# Patient Record
Sex: FEMALE | Race: WHITE | NOT HISPANIC OR LATINO | URBAN - METROPOLITAN AREA
[De-identification: names, ages, dates, MRNs, and addresses within clinical notes are randomized per-mention and may not be internally consistent; named-entity substitution may affect disease eponyms.]

---

## 2018-11-08 ENCOUNTER — AMBULATORY - HEALTHEAST (OUTPATIENT)
Dept: CARDIAC REHAB | Facility: CLINIC | Age: 83
End: 2018-11-08

## 2018-11-08 DIAGNOSIS — Z95.2 S/P TAVR (TRANSCATHETER AORTIC VALVE REPLACEMENT): ICD-10-CM

## 2018-11-16 ENCOUNTER — AMBULATORY - HEALTHEAST (OUTPATIENT)
Dept: CARDIAC REHAB | Facility: CLINIC | Age: 83
End: 2018-11-16

## 2018-11-16 DIAGNOSIS — Z95.2 S/P TAVR (TRANSCATHETER AORTIC VALVE REPLACEMENT): ICD-10-CM

## 2018-11-16 RX ORDER — PREDNISONE 2.5 MG/1
7.5 TABLET ORAL DAILY
Status: SHIPPED | COMMUNITY
Start: 2018-11-16

## 2018-11-16 RX ORDER — ALENDRONATE SODIUM 70 MG/1
70 TABLET ORAL
Status: SHIPPED | COMMUNITY
Start: 2018-11-16

## 2018-11-16 RX ORDER — HYDROXYCHLOROQUINE SULFATE 200 MG/1
200 TABLET, FILM COATED ORAL DAILY
Status: SHIPPED | COMMUNITY
Start: 2018-11-16

## 2018-11-16 RX ORDER — WARFARIN SODIUM 2.5 MG/1
2.5 TABLET ORAL SEE ADMIN INSTRUCTIONS
Status: SHIPPED | COMMUNITY
Start: 2018-11-16

## 2018-11-16 RX ORDER — ASPIRIN 81 MG/1
81 TABLET, CHEWABLE ORAL DAILY
Status: SHIPPED | COMMUNITY
Start: 2018-11-16

## 2018-11-16 RX ORDER — PRAVASTATIN SODIUM 40 MG
40 TABLET ORAL AT BEDTIME
Status: SHIPPED | COMMUNITY
Start: 2018-11-16

## 2018-11-16 RX ORDER — LEVOTHYROXINE SODIUM 100 UG/1
100 TABLET ORAL DAILY
Status: SHIPPED | COMMUNITY
Start: 2018-11-16

## 2018-11-16 RX ORDER — AMLODIPINE BESYLATE 10 MG/1
10 TABLET ORAL DAILY
Status: SHIPPED | COMMUNITY
Start: 2018-11-16

## 2018-11-16 RX ORDER — TRIAMTERENE AND HYDROCHLOROTHIAZIDE 37.5; 25 MG/1; MG/1
1 CAPSULE ORAL EVERY MORNING
Status: SHIPPED | COMMUNITY
Start: 2018-11-16

## 2018-11-16 RX ORDER — ACETAMINOPHEN 500 MG
500 TABLET ORAL EVERY 6 HOURS PRN
Status: SHIPPED | COMMUNITY
Start: 2018-11-16

## 2018-11-16 RX ORDER — CITALOPRAM HYDROBROMIDE 20 MG/1
20 TABLET ORAL DAILY
Status: SHIPPED | COMMUNITY
Start: 2018-11-16

## 2018-11-16 RX ORDER — MIRTAZAPINE 15 MG/1
7.5 TABLET, FILM COATED ORAL AT BEDTIME
Status: SHIPPED | COMMUNITY
Start: 2018-11-16

## 2018-11-20 ENCOUNTER — AMBULATORY - HEALTHEAST (OUTPATIENT)
Dept: CARDIAC REHAB | Facility: CLINIC | Age: 83
End: 2018-11-20

## 2018-11-20 DIAGNOSIS — Z95.2 S/P TAVR (TRANSCATHETER AORTIC VALVE REPLACEMENT): ICD-10-CM

## 2018-11-27 ENCOUNTER — AMBULATORY - HEALTHEAST (OUTPATIENT)
Dept: CARDIAC REHAB | Facility: CLINIC | Age: 83
End: 2018-11-27

## 2018-11-27 DIAGNOSIS — Z95.2 S/P TAVR (TRANSCATHETER AORTIC VALVE REPLACEMENT): ICD-10-CM

## 2018-12-04 ENCOUNTER — AMBULATORY - HEALTHEAST (OUTPATIENT)
Dept: CARDIAC REHAB | Facility: CLINIC | Age: 83
End: 2018-12-04

## 2018-12-04 DIAGNOSIS — Z95.2 S/P TAVR (TRANSCATHETER AORTIC VALVE REPLACEMENT): ICD-10-CM

## 2018-12-06 ENCOUNTER — AMBULATORY - HEALTHEAST (OUTPATIENT)
Dept: CARDIAC REHAB | Facility: CLINIC | Age: 83
End: 2018-12-06

## 2018-12-06 DIAGNOSIS — Z95.2 S/P TAVR (TRANSCATHETER AORTIC VALVE REPLACEMENT): ICD-10-CM

## 2018-12-11 ENCOUNTER — AMBULATORY - HEALTHEAST (OUTPATIENT)
Dept: CARDIAC REHAB | Facility: CLINIC | Age: 83
End: 2018-12-11

## 2018-12-11 DIAGNOSIS — Z95.2 S/P TAVR (TRANSCATHETER AORTIC VALVE REPLACEMENT): ICD-10-CM

## 2018-12-13 ENCOUNTER — AMBULATORY - HEALTHEAST (OUTPATIENT)
Dept: CARDIAC REHAB | Facility: CLINIC | Age: 83
End: 2018-12-13

## 2018-12-13 DIAGNOSIS — Z95.2 S/P TAVR (TRANSCATHETER AORTIC VALVE REPLACEMENT): ICD-10-CM

## 2018-12-18 ENCOUNTER — AMBULATORY - HEALTHEAST (OUTPATIENT)
Dept: CARDIAC REHAB | Facility: CLINIC | Age: 83
End: 2018-12-18

## 2018-12-18 DIAGNOSIS — Z95.2 S/P TAVR (TRANSCATHETER AORTIC VALVE REPLACEMENT): ICD-10-CM

## 2018-12-20 ENCOUNTER — AMBULATORY - HEALTHEAST (OUTPATIENT)
Dept: CARDIAC REHAB | Facility: CLINIC | Age: 83
End: 2018-12-20

## 2018-12-20 DIAGNOSIS — Z95.2 S/P TAVR (TRANSCATHETER AORTIC VALVE REPLACEMENT): ICD-10-CM

## 2018-12-27 ENCOUNTER — AMBULATORY - HEALTHEAST (OUTPATIENT)
Dept: CARDIAC REHAB | Facility: CLINIC | Age: 83
End: 2018-12-27

## 2018-12-27 DIAGNOSIS — Z95.2 S/P TAVR (TRANSCATHETER AORTIC VALVE REPLACEMENT): ICD-10-CM

## 2019-01-03 ENCOUNTER — AMBULATORY - HEALTHEAST (OUTPATIENT)
Dept: CARDIAC REHAB | Facility: CLINIC | Age: 84
End: 2019-01-03

## 2019-01-03 DIAGNOSIS — Z95.2 S/P TAVR (TRANSCATHETER AORTIC VALVE REPLACEMENT): ICD-10-CM

## 2019-01-08 ENCOUNTER — AMBULATORY - HEALTHEAST (OUTPATIENT)
Dept: CARDIAC REHAB | Facility: CLINIC | Age: 84
End: 2019-01-08

## 2019-01-08 DIAGNOSIS — Z95.2 S/P TAVR (TRANSCATHETER AORTIC VALVE REPLACEMENT): ICD-10-CM

## 2019-01-15 ENCOUNTER — AMBULATORY - HEALTHEAST (OUTPATIENT)
Dept: CARDIAC REHAB | Facility: CLINIC | Age: 84
End: 2019-01-15

## 2019-01-15 DIAGNOSIS — Z95.2 S/P TAVR (TRANSCATHETER AORTIC VALVE REPLACEMENT): ICD-10-CM

## 2019-01-17 ENCOUNTER — AMBULATORY - HEALTHEAST (OUTPATIENT)
Dept: CARDIAC REHAB | Facility: CLINIC | Age: 84
End: 2019-01-17

## 2019-01-17 DIAGNOSIS — Z95.2 S/P TAVR (TRANSCATHETER AORTIC VALVE REPLACEMENT): ICD-10-CM

## 2019-01-29 ENCOUNTER — AMBULATORY - HEALTHEAST (OUTPATIENT)
Dept: CARDIAC REHAB | Facility: CLINIC | Age: 84
End: 2019-01-29

## 2019-01-29 DIAGNOSIS — Z95.2 S/P TAVR (TRANSCATHETER AORTIC VALVE REPLACEMENT): ICD-10-CM

## 2019-01-31 ENCOUNTER — AMBULATORY - HEALTHEAST (OUTPATIENT)
Dept: CARDIAC REHAB | Facility: CLINIC | Age: 84
End: 2019-01-31

## 2019-01-31 DIAGNOSIS — Z95.2 S/P TAVR (TRANSCATHETER AORTIC VALVE REPLACEMENT): ICD-10-CM

## 2019-02-05 ENCOUNTER — RECORDS - HEALTHEAST (OUTPATIENT)
Dept: ADMINISTRATIVE | Facility: OTHER | Age: 84
End: 2019-02-05

## 2019-02-12 ENCOUNTER — AMBULATORY - HEALTHEAST (OUTPATIENT)
Dept: CARDIAC REHAB | Facility: CLINIC | Age: 84
End: 2019-02-12

## 2019-02-12 DIAGNOSIS — Z95.2 S/P TAVR (TRANSCATHETER AORTIC VALVE REPLACEMENT): ICD-10-CM

## 2021-06-02 VITALS — WEIGHT: 118 LBS

## 2021-06-02 VITALS — WEIGHT: 117 LBS

## 2021-06-02 VITALS — WEIGHT: 117.4 LBS

## 2021-06-02 VITALS — WEIGHT: 114.4 LBS

## 2021-06-02 VITALS — WEIGHT: 116.4 LBS

## 2021-06-02 VITALS — WEIGHT: 115.2 LBS

## 2021-06-02 VITALS — WEIGHT: 116 LBS

## 2021-06-02 VITALS — WEIGHT: 113.8 LBS

## 2021-06-02 VITALS — WEIGHT: 116.2 LBS

## 2021-06-02 VITALS — WEIGHT: 113.6 LBS

## 2021-06-02 VITALS — WEIGHT: 115 LBS

## 2021-06-18 NOTE — LETTER
Letter by Domenico Means EPS at      Author: Domenico Means EPS Service: -- Author Type: --    Filed:  Encounter Date: 2/12/2019 Status: (Other)         February 12, 2019      Dear Dr. Saldivar,   While attending Outpatient Cardiac Rehabilitation, your patient Ginna Magana, 6/3/1929, completed the [facility] Depression Scale (JORGE-D) Survey.  This survey is a tool used to identify depression in patients.  Your patient scored 15, which is in the mild to moderate category.  Research indicates that depression can be a contributing factor of secondary heart events and subsequent hospitalizations.    The JORGE-S is a survey of twenty (20) questions given to patients at the beginning and end of their rehabilitation program.  The survey is self-administered by the patient or interview administered by a health care provider.  The survey scores the twenty different questions from '0' (rarely or none of the time) up to '3' (most or all of the time).  The final score is a summation of the twenty questions, with a higher score indicating a higher severity of depression.    Because of this score, we will encourage your patient to:    1. Attend the educational sessions offered by our Cardiac Rehabilitation Department on stress management, relaxation techniques, and the emotional aspects of heart disease.    2. Borrow books or videos on depression, anxiety, and stress management available in the Cardiac Rehabilitation library.    3. Confide in her support persons, such as family, friends, clergy, or professional counseling services.    4. Make an appointment with you for follow-up.    Please feel free to contact us for further discussion.    Sincerely,    [facility] Staff   Cardiac Rehab  [address] [facility] [address]  Phone Number:  [phone]

## 2021-06-21 NOTE — PROGRESS NOTES
Cardiac Rehab  Phase II Assessment    Assessment Date: 11/16/2018      Diagnosis: Aortic Stenosis, Chronic dHF  Procedure: TAVR - Mild periprosthetic regurgitation Date of Onset: 9/27/2018   ICD/Pacemaker: No   Post-op Complications: Transient LBBB - no results of event monitor yet  ECG History: SR w/PAC's w/1'AVB, LVH, ST and T wave abnormality, LBBB transient during/after TAVR, (Event monitor placed after d/c) EF%: 57% (10/3/2018)  Past Medical History: Osteoarthritis, Non-Hodgkins Lymphoma s/p chemo and radiation, COPD, DM Type II, Chronic dHF, hypothyroidism, HTN, Hyperlipidemia, Anxiety, Anemia, Fibromyalgia- on chronic prednisone, skin cancer, sleep apnea- couldn't tolerate CPAP but has tennis ball vest, thromboembolism (1960's)  Angio 3/2018- Free of significant CAD  10/2- admitted for sepsis (UTI)- no further cardiology recommendations.     Physical Assessment  Precautions/ Physical Limitations: Hypertension, Standard  Oxygen: No  O2 Sats: 97% RA  Lung Sounds: Clear in all lobes posterior Edema: None  Incisions: B groin sites healed well per patient  Sleeping Pattern: good   Appetite: good   Nutrition Risk Screen: Completed    Pain  Intensity: (0-10 scale) 0      Psychosocial/ Emotional Health  1. In the past 12 months, have you been in a relationship where you have been abused physically, emotionally, sexually or financially? No  notified: NA  2. Who do you turn to for emotional support?: Lonnie,   3. Do you have cultural or spiritual needs? No  4. Have there been any major life changes in the past 12 months? Yes , Health and Brother passed away in April     Referral Information  Primary Physician: Catalina Saldivar MD  Cardiologist: Dr. Ilan Patton ([facility])  Surgeon: Dr. Gutiérrez/Dr. Smith    Home exercise/Equipment: Exercise Room at Living Facility with Bikes and Treadmills, Pool (pt not interested in using)    Patient's long-term goal(s): 1. Improve energy/endurance and  stamina 2. Improve breathing     1. Living Accommodations: Apartment- Senior Living Facility (they are in the Independent living) Steps: Yes- Between floors      Support people at home: Wife   2. Marital Status:   3. Family is able to assist with cares      Mosque/Community involvement: None  4. Recreation/Hobbies: Cooking, Watching TV, Walking, Socialize with friends

## 2021-06-21 NOTE — PROGRESS NOTES
Ginna Magana has participated in 2 sessions of Phase II Cardiac Rehab.    Progress Report:   Cardiac Rehab Treatment Progress Report 11/16/2018 11/20/2018   Weight 113 lbs 13 oz 117 lbs 6 oz   Pre Exercise  HR 67 70   Pre Exercise /68 112/54   Pre Blood Sugar (mg/dl) N/A- pt only on metformin -   Nustep Peak Heart Rate - 80   Nustep Peak Blood Pressure - 136/60   Heart Rate 72 72   Post Exercise /60 112/50   ECG SR/SA w/BBB w/rare PVC's SR/SA w/BBB   Total Exercise Minutes 6 25         Current Status:  Symptomatic Pt c/o mild SOB with ex. and Therapists Comments: Pt just starting rehab.  Pt is doing Nustep at her senior residence 1-2x/week.    If Physician recommends change in treatment plan, please place orders.        __________________________________________________      _____________  Signature                                                                                                  Date

## 2021-06-21 NOTE — PROGRESS NOTES
Ginna ADOLFO Magana has participated in 3 sessions of Phase II Cardiac Rehab.    Progress Report:   Cardiac Rehab Treatment Progress Report 11/16/2018 11/20/2018 11/27/2018   Weight 113 lbs 13 oz 117 lbs 6 oz 113 lbs 10 oz   Pre Exercise  HR 67 70 -   Pre Exercise /68 112/54 124/66   Pre Blood Sugar (mg/dl) N/A- pt only on metformin - -   Nustep Peak Heart Rate - 80 83   Nustep Peak Blood Pressure - 136/60 122/52   Heart Rate 72 72 82   Post Exercise /60 112/50 126/60   ECG SR/SA w/BBB w/rare PVC's SR/SA w/BBB SR/SA with brief tachy run   Total Exercise Minutes 6 25 28         Current Status:  Symptomatic Pt c/o SOB, fatigue with ambulation. and Therapists Comments: Pt has tolerated seated recumbent stepper (Nustep) fairly well.  Encouraged pt to exercise regularly at home by walking in short bouts.    If Physician recommends change in treatment plan, please place orders.        __________________________________________________      _____________  Signature                                                                                                  Date

## 2021-06-21 NOTE — PROGRESS NOTES
ITP ASSESSMENT   Assessment Day: Initial    Session Number: 1  Precautions: Hypertension, Standard    Diagnosis: Valve;CHF (chronic dHF)    Risk Stratification: High    Referring Provider: Catalina Saldivar MD   ITP: Dr. Muir  EXERCISE  Exercise Assessment: Initial       6 Minute Walk Test   Pre   Pre Exercise HR: 67                    Pre Exercise BP: 156/68      Peak  Peak HR: 91                   Peak BP: 178/60    Peak feet: 1075    Peak O2 SAT: 95    Peak RPE: 13    Peak MPH: 2.04      Symptoms:  Peak Symptoms: Slightly winded- resolved with rest      5 mins. Post  5 Min Post HR: 72    5 Min Post BP: 152/60                           Exercise Plan  Goals Next 30 days  ADL'S: Pt to resume getting dressed with less SOB and fatigue (2.5 MET)    Leisure: Pt to resume dusting with less SOB/fatigue (2.3 MET)    Work: Retired      Education Goals: Patient can state cardiac s/s and appropriate emergency response.    Education Goals Met: Medication review.;Has system for taking medication.      Exercise Prescription  Exercise Mode: Treadmill;Bike;Nustep;Arm Erg.;Stairs;Hallway Walking    Frequency: 2x/week    Duration: 30-40 minutes    Intensity / THR: 20-30 beats above resting heart rate    RPE 11-14  Progression / Met level: 2.5-3+    Resistive Training?: Yes      Current Exercise (mins/week): 105 (15 min walking everyday)      Interventions  Home Exercise:  Mode: Walking    Frequency: 2-3x/week    Duration: 15-20 minutes 2x/day and gradually increase duration      Education Material : Educational videos;Provide written material;Individual education and counseling;Offer educational classes      Education Completed  Exercise Education Completed: Cardiac Anatomy;Signs and Symptoms;Medication review;RPE;Emergency Plan;Home Exercise;Warm up/cool down;FITT Principles;BP/HR Reponse to exercise;Stretching;Strength training;Benefits of Exercise;End point of exercise              Exercise Follow-up/Discharge  Follow  "up/Discharge: Pt is walking daily for HEP and is motivated to increase endurance and stamina. Pt does have access to a exercise room at her living facility.    NUTRITION  Nutrition Assessment: Initial      Nutrition Risk Factors:  Nutrition Risk Factors: Diabetes;Dyslipidemia  HbA1c: 7.5 (10/3/2018)  Monitors blood sugar at home: Yes  Frequency: 1x/week  Cholesterol: 183 (10/23/2017)  LDL: 76  HDL: 71  Triglycerides: 181      Nutrition Plan  Interventions  Diet Consult: Declined    Other Nutrition Intervention: Therapist/Pt Discussion;Provide with Written Material      Education Completed  Nutrition Education Completed: Low Saturated fat diet;Risk factor overview;Low sodium diet;Weight management      Goals  Nutrition Goals (Next 30 days): Improve Rate Your Plate Survey Score;Patient will maintain current weight or gradual weight gain      Goals Met  Nutrition Goals Met: Patient can identify their risk factors for CAD;Patient follows a low sodium diet;Completed Nutritional Risk Screen;Provided Rate your Plate Survey;Reviewed Dietitian schedule;Patient states following a low saturated fat diet;Patient knows appropriate portion size      Height, Weight, and  BMI  Weight: 113 lb 12.8 oz (51.6 kg)  Height: 5' 5\" (1.651 m)  BMI: 18.94      Nutrition Follow-up  Follow-up/Discharge: Pt declines diet consult due to having a good understanding of a heart healthy diet. Pt does have handouts from the hospital. Pt is label reading and following a strict low sodium and low fat diet. Pt does the cooking. Pt is label reading. Pt does also monitor her carb intake and sweets. Pt is eating fresh fruits and vegetables.          Other Risk Factors  Other Risk Factor Assessment: Initial      HTN Risk Factor: Hypertension      BP: 156/68  Hypertension Plan  Goals  HTN Goals: Patient demonstrates understanding of HTN, no goals identified for the next 30 days      Goals Met  HTN Goals Met: Exercises regularly;Take medication as " prescribed;Follow low sodium diet      HTN Interventions  HTN Interventions: Therapist/patient discussion;Provide written material;Offer educational videos;Offer educational classes      HTN Education Completed  HTN Education Completed: Low sodium diet;Medication review;Risk factor overview      Tobacco Risk Factor: NA        Risk Factor Follow-up   Follow-up/Discharge: Pt monitors bp at home. Pt aware of CRF     PSYCHOSOCIAL  Psychosocial Assessment: Initial       [facility]      JORGE-D Score: 8      Psychosocial Risk Factor: NA      Psychosocial Plan  Interventions    Interventions: Offer educational videos and classes;Provide written material;Individual education and counseling      Education Completed  Education Completed: Relaxation/Coping Techniques;S/S of depression;Effects of stress on body      Goals  Goals (Next 30 days): Improvement in [employer] COOP score      Goals Met  Goals Met: Identified Support system;Oriented to stress management classes;Identify stressors;Practicing stress management skills      Psychosocial Follow-up  Follow-up/Discharge: Pt denies stress and feels she is coping well with her heart event. Pt does take celexa. Pt has a great support system in her  and family. Pt enjoys socializing with friends and watching the news. Pt is motivated and excited to start cardiac rehab.              Patient involved in Goal setting?: Yes  LTG 4 METS

## 2021-06-22 NOTE — PROGRESS NOTES
ITP ASSESSMENT   Assessment Day: 60 Day    Session Number: 11  Precautions: HTN, Standard Cardiac SX    Diagnosis: Valve;CHF (chronic dHF)    Risk Stratification: High    Referring Provider: Catalina Saldivar MD   ITP: Dr. Muir  EXERCISE  Exercise Assessment: Reassessment                              Exercise Plan  Goals Next 30 days  ADL'S: Pt to resume walking stairs with less SOB.    Leisure: Walk 3-5x/week for 10-20 mins    Work: Retired      Education Goals: Patient can state cardiac s/s and appropriate emergency response.    Education Goals Met: Medication review.;Has system for taking medication.                          Goals Met  30 Day Progression: Pt out of rehab. Will address next session.       Initial ADL's goals met: Goal met- Pt has resumed getting dressed with less SOB, fatigue.    Initial Leisure goals met: Goal not met- pt has not resumed dusting with less SOB, fatigue.    Intial Work goals met: Retired    No Data Recorded    Exercise Prescription  Exercise Mode: Treadmill;Bike;Nustep;Arm Erg.;Stairs;Hallway Walking    Frequency: 2x/week    Duration: 40-50 minutes    Intensity / THR: 20-30 beats above resting heart rate    RPE 11-14  Progression / Met level: 2.9-3.3    Resistive Training?: Yes      Current Exercise (mins/week): 80      Interventions  Home Exercise:  Mode: Walking    Frequency: 2-3x/week    Duration: 15-20 mins      Education Material : Educational videos;Provide written material;Individual education and counseling;Offer educational classes      Education Completed  Exercise Education Completed: Cardiac Anatomy;Signs and Symptoms;Medication review;RPE;Emergency Plan;Home Exercise;Warm up/cool down;FITT Principles;BP/HR Reponse to exercise;Stretching;Strength training;Benefits of Exercise;End point of exercise              Exercise Follow-up/Discharge  Follow up/Discharge: Pt out of rehab. Will address next session.    NUTRITION  Nutrition Assessment: Reassessment      Nutrition  "Risk Factors:  Nutrition Risk Factors: Diabetes;Dyslipidemia  Monitors blood sugar at home: Yes  Frequency: 1x/week      Nutrition Plan  Interventions  Diet Consult: Declined    Other Nutrition Intervention: Diet Class;Therapist/Pt Discussion;Educational Videos;Provide with Written Material    Initial Rate Your Plate Score: 56      Education Completed  Nutrition Education Completed: Low Saturated fat diet;Risk factor overview;Low sodium diet;Weight management      Goals  Nutrition Goals (Next 30 days): Improve Rate Your Plate Survey Score;Patient will maintain current weight or gradual weight gain      Goals Met  Nutrition Goals Met: Patient can identify their risk factors for CAD;Patient follows a low sodium diet;Completed Nutritional Risk Screen;Provided Rate your Plate Survey;Reviewed Dietitian schedule;Patient states following a low saturated fat diet;Patient knows appropriate portion size      Height, Weight, and  BMI  Weight: 116 lb 6.4 oz (52.8 kg)  Height: 5' 5\" (1.651 m)  BMI: 19.37      Nutrition Follow-up  Follow-up/Discharge: Pt out of rehab. Will address next session.          Other Risk Factors  Other Risk Factor Assessment: Reassessment      HTN Risk Factor: Hypertension      Pre Exercise BP: 130/52  Post Exercise BP: 106/52      Hypertension Plan  Goals  HTN Goals: Patient demonstrates understanding of HTN, no goals identified for the next 30 days      Goals Met  HTN Goals Met: Exercises regularly;Take medication as prescribed;Follow low sodium diet      HTN Interventions  HTN Interventions: Therapist/patient discussion;Provide written material;Offer educational videos;Offer educational classes      HTN Education Completed  HTN Education Completed: Low sodium diet;Medication review;Risk factor overview      Tobacco Risk Factor: NA        Risk Factor Follow-up   Follow-up/Discharge: Pt out of rehab. Will address next session.      PSYCHOSOCIAL  Psychosocial Assessment: Reassessment     "     Psychosocial Risk Factor: NA      Psychosocial Plan  Interventions  Interventions: Offer educational videos and classes;Provide written material;Individual education and counseling      Education Completed  Education Completed: Relaxation/Coping Techniques;S/S of depression;Effects of stress on body      Goals  Goals (Next 30 days): [facility]      Goals Met  Goals Met: Identified Support system;Oriented to stress management classes;Identify stressors;Practicing stress management skills      Psychosocial Follow-up  Follow-up/Discharge: Pt out of rehab. Will address next session.              Patient involved in Goal setting?: No (Pt out of rehab. Will address next session)

## 2021-06-22 NOTE — PROGRESS NOTES
ITP ASSESSMENT   Assessment Day: 30 Day    Session Number: 6  Precautions: Standard cardiac sx, HTN    Diagnosis: Valve;CHF (chronic dHF)    Risk Stratification: High    Referring Provider: Dr. Gutiérrez  ITP sent to Dr. Muir  EXERCISE  Exercise Assessment: Reassessment       6 Minute Walk Test   Pre   Pre Exercise HR: 67                    Pre Exercise BP: 156/68      Peak  Peak HR: 91                   Peak BP: 178/60    Peak feet: 1075    Peak O2 SAT: 95    Peak RPE: 13    Peak MPH: 2.04      Symptoms:  Peak Symptoms: Slightly winded- resolved with rest      5 mins. Post  5 Min Post HR: 72    5 Min Post BP: 152/60                           Exercise Plan  Goals Next 30 days  ADL'S: Pt to resume walking stairs with less SOB.    Leisure: Walk 3-5x/week for 10-20 mins    Work: Retired      Education Goals: Patient can state cardiac s/s and appropriate emergency response.    Education Goals Met: Medication review.;Has system for taking medication.                          Goals Met  Initial ADL's goals met: Goal met- Pt has resumed getting dressed with less SOB, fatigue.    Initial Leisure goals met: Goal not met- pt has not resumed dusting with less SOB, fatigue.    Intial Work goals met: Retired      Exercise Prescription  Exercise Mode: Treadmill;Bike;Nustep;Arm Erg.;Stairs;Hallway Walking    Frequency: 2x/week    Duration: 30-45 mins    Intensity / THR: 20-30 beats above resting heart rate    RPE 11-14  Progression / Met level: 2.8-3.3    Resistive Training?: Yes      Current Exercise (mins/week): 110      Interventions  Home Exercise:  Mode: Walking    Frequency: 2-3x/week    Duration: 15-20 mins      Education Material : Educational videos;Provide written material;Individual education and counseling;Offer educational classes      Education Completed  Exercise Education Completed: Cardiac Anatomy;Signs and Symptoms;Medication review;RPE;Emergency Plan;Home Exercise;Warm up/cool down;FITT Principles;BP/HR Reponse  "to exercise;Stretching;Strength training;Benefits of Exercise;End point of exercise              Exercise Follow-up/Discharge  Follow up/Discharge: Pt has reached peak of 2.6 METs on Nustep for 20 mins.           NUTRITION  Nutrition Assessment: Reassessment      Nutrition Risk Factors:  Nutrition Risk Factors: Diabetes;Dyslipidemia  HbA1c: 7.5 (10/3/2018)  Monitors blood sugar at home: Yes  Frequency: 1x/week  Cholesterol: 183 (10/23/2017)  LDL: 76  HDL: 71  Triglycerides: 181      Nutrition Plan  Interventions  Diet Consult: Declined    Other Nutrition Intervention: Diet Class;Therapist/Pt Discussion;Educational Videos;Provide with Written Material    Initial Rate Your Plate Score: 56      Education Completed  Nutrition Education Completed: Low Saturated fat diet;Risk factor overview;Low sodium diet;Weight management      Goals  Nutrition Goals (Next 30 days): Improve Rate Your Plate Survey Score;Patient will maintain current weight or gradual weight gain      Goals Met  Nutrition Goals Met: Patient can identify their risk factors for CAD;Patient follows a low sodium diet;Completed Nutritional Risk Screen;Provided Rate your Plate Survey;Reviewed Dietitian schedule;Patient states following a low saturated fat diet;Patient knows appropriate portion size      Height, Weight, and  BMI  Weight: 116 lb (52.6 kg)  Height: 5' 5\" (1.651 m)  BMI: 19.3      Nutrition Follow-up  Follow-up/Discharge: Pt declines diet consult due to having a good understanding of a heart healthy diet. Pt does have handouts from the hospital. Pt is label reading and following a strict low sodium and low fat diet. Pt does the cooking. Pt is label reading. Pt does also monitor her carb intake and sweets. Pt is eating fresh fruits and vegetables.        Other Risk Factors  Other Risk Factor Assessment: Reassessment      HTN Risk Factor: Hypertension      Pre Exercise BP: 120/52  Post Exercise BP: 120/56      Hypertension Plan  Goals  HTN Goals: " Patient demonstrates understanding of HTN, no goals identified for the next 30 days      Goals Met  HTN Goals Met: Exercises regularly;Take medication as prescribed;Follow low sodium diet      HTN Interventions  HTN Interventions: Therapist/patient discussion;Provide written material;Offer educational videos;Offer educational classes      HTN Education Completed  HTN Education Completed: Low sodium diet;Medication review;Risk factor overview      Tobacco Risk Factor: NA        Risk Factor Follow-up   Follow-up/Discharge: Pt monitors BP at home.  She reports watching her sodium intake.         PSYCHOSOCIAL  Psychosocial Assessment: Reassessment       [facility] Q of L Summary Score: 18      JORGE-D Score: 8      Psychosocial Risk Factor: NA      Psychosocial Plan  Interventions  Interventions: Offer educational videos and classes;Provide written material;Individual education and counseling      Education Completed  Education Completed: Relaxation/Coping Techniques;S/S of depression;Effects of stress on body      Goals  Goals (Next 30 days): Improvement in [employer] COOP score      Goals Met  Goals Met: Identified Support system;Oriented to stress management classes;Identify stressors;Practicing stress management skills      Psychosocial Follow-up  Follow-up/Discharge: Pt reports her stress level varies.  It often depends on her 's mood.  She states her  often c/o their living situation.           Patient involved in Goal setting?: Yes      Signature: _____________________________________________________________    Date: __________________    Time: __________________

## 2021-06-23 NOTE — PROGRESS NOTES
ITP ASSESSMENT   Assessment Day: 90 Day    Session Number: 16  Precautions: HTN, standard cardiac sx    Diagnosis: Valve;CHF (chronic dHF)    Risk Stratification: High    Referring Provider: Catalina Saldivar MD   ITP sent to Dr. Muir  EXERCISE  Exercise Assessment: Reassessment                           Exercise Plan  Goals Next 30 days  ADL'S: Independent, does still report SOB    Leisure: Continue walking program    Work: Retired      Education Goals: All goals in this section met    Education Goals Met: Patient can state cardiac s/s and appropriate emergency response.;Has system for taking medication.;Medication review.                          Goals Met  60 day ADL'S goals met: Patient resumed eating, still has SOB about the same, not any worse    60 day Leisure goals met: Resumed grocery shopping, SOB and fatigue has not improved    60 day Work goals met: Retired    60 Day Progression: Patient reports that she still has SOB with activites, does say that it has not gotten any worse, just has not gotten better. Patient has had appts. regarding this, all tests have come back with no reason for the SOB.  Patient states that she is back to all her normal activites.      30 day ADL'S goals met: Pt has been able to increase her endurance on the stairs at home but the SOB has not improved (MD's are working her up for the cause of this). Pt was able to tolerate 5 min but did have SOB    30 day Leisure goals met: Pt is walking 3x/week outside of cardiac rehab for 15minutes 2x/day. Pt does continue to feel SOB with activities. MD's are aware.     30 day Work goals met: Retired    30 Day Progression: Pt is limited by SOB at home with activites. Pt has reached a max MET level of 4 MET on ky stairs for 5 min and a 2.6-2.8 MET on the TM and bikes. Pt has utilized the arm bike.       Initial ADL's goals met: Goal met- Pt has resumed getting dressed with less SOB, fatigue.    Initial Leisure goals met: Goal not met- pt has  not resumed dusting with less SOB, fatigue.    Intial Work goals met: Retired      Exercise Prescription  Exercise Mode: Treadmill;Bike;Nustep;Arm Erg.;Stairs;Hallway Walking    Frequency: 2x/week    Duration: 40-45 minutes    Intensity / THR: 20-30 beats above resting heart rate    RPE 11-14  Progression / Met level: 2.9+    Resistive Training?: Yes      Current Exercise (mins/week): 120      Interventions  Home Exercise:  Mode: walking    Frequency: 2-3x/week    Duration: 15-30 min.      Education Material : Educational videos;Provide written material;Individual education and counseling;Offer educational classes      Education Completed  Exercise Education Completed: Cardiac Anatomy;Signs and Symptoms;Medication review;RPE;Emergency Plan;Home Exercise;Warm up/cool down;FITT Principles;BP/HR Reponse to exercise;Stretching;Strength training;Benefits of Exercise;End point of exercise              Exercise Follow-up/Discharge  Follow up/Discharge: Patient has reached a 4 MET level on treadmill.  [facility]  Patient states that she is back to her activites, still has the SOB, but states that it has not gotten worse   NUTRITION  Nutrition Assessment: Reassessment      Nutrition Risk Factors:  Nutrition Risk Factors: Dyslipidemia;Diabetes  Monitors blood sugar at home: Yes  Frequency: 1x/week      Nutrition Plan  Interventions  Diet Consult: Declined    Other Nutrition Intervention: Therapist/Pt Discussion    Initial Rate Your Plate Score: 56      Education Completed  Nutrition Education Completed: Low Saturated fat diet;Risk factor overview;Low sodium diet;Weight management      Goals  Nutrition Goals (Next 30 days): Patient demonstrated understanding of cardiac nutrition, no goals identified for the next 30 days      Goals Met  Nutrition Goals Met: Patient can identify their risk factors for CAD;Patient follows a low sodium diet;Completed Nutritional Risk Screen;Provided Rate your  "Plate Survey;Reviewed Dietitian schedule;Patient states following a low saturated fat diet;Patient knows appropriate portion size;Patient will maintain current weight or gradual weight gain      Height, Weight, and  BMI  Weight: 117 lb (53.1 kg)  Height: 5' 5\" (1.651 m)  BMI: 19.47      Nutrition Follow-up  Follow-up/Discharge: Pt declines diet consult at this time. Pt has a good understanding of heart healthy diet. Pt does eat fruits and vegetables and follows a low fat and low salt diet. Pt and  do the cooking and label read. Pt does weigh herself everyday at home.          Other Risk Factors  Other Risk Factor Assessment: Reassessment      HTN Risk Factor: Hypertension      Pre Exercise BP: 144/68  Post Exercise BP: 136/52      Hypertension Plan  Goals  HTN Goals: Patient demonstrates understanding of HTN, no goals identified for the next 30 days      Goals Met  HTN Goals Met: Follow low sodium diet;Take medication as prescribed;Exercises regularly      HTN Interventions  HTN Interventions: Therapist/patient discussion;Provide written material;Offer educational videos;Offer educational classes      HTN Education Completed  HTN Education Completed: Low sodium diet;Medication review;Risk factor overview      Tobacco Risk Factor: NA    Risk Factor Follow-up   Follow-up/Discharge: Pt monitors bp at home with her home cuff.    PSYCHOSOCIAL  Psychosocial Assessment: Reassessment       Psychosocial Risk Factor: Stress      Psychosocial Plan  Interventions  Interventions: Offer educational videos and classes;Provide written material;Individual education and counseling      Education Completed  Education Completed: Relaxation/Coping Techniques;S/S of depression;Effects of stress on body      Goals  Goals (Next 30 days): Improvement in [employer] COOP score      Goals Met  Goals Met: Identified Support system;Oriented to stress management classes;Identify stressors;Practicing stress management " skills      Psychosocial Follow-up  Follow-up/Discharge: Patient states that she enjoys baking and also reading the paper for relaxation.             Patient involved in Goal setting?: Yes      Signature: _____________________________________________________________    Date: __________________    Time: __________________

## 2021-06-24 NOTE — PROGRESS NOTES
ITP ASSESSMENT   Assessment Day: 120 Day    Session Number: 17  Precautions: HTN, standard cardiac sx    Diagnosis: Valve;CHF (chronic dHF)    Risk Stratification: High    Referring Provider: Catalina Saldivar MD  EXERCISE  Exercise Assessment: Discharge       6 Minute Walk Test   Pre   Pre Exercise HR: 79                    Pre Exercise BP: 120/52      Peak  Peak HR: 101                   Peak BP: 164/54    Peak feet: 750    Peak O2 SAT: 97    Peak RPE: 13    Peak MPH: 1.42      Symptoms:  Peak Symptoms: SOB      5 mins. Post  5 Min Post HR: 76    5 Min Post BP: 130/54                           Exercise Plan  Goals Next 30 days  ADL'S: Independent, does still report SOB    Leisure: Continue walking program    Work: Retired      Education Goals: All goals in this section met    Education Goals Met: Patient can state cardiac s/s and appropriate emergency response.;Has system for taking medication.;Medication review.                          Goals Met  90 day ADL'S goals met: Independent with ADLs.    90 day Leisure goals met: Goal met- pt has generally been walking at home.    90 day Work goals met: Retired      60 day ADL'S goals met: Patient resumed eating, still has SOB about the same, not any worse    60 day Leisure goals met: Resumed grocery shopping, SOB and fatigue has not improved    60 day Work goals met: Retired    60 Day Progression: Patient reports that she still has SOB with activites, does say that it has not gotten any worse, just has not gotten better. Patient has had appts. regarding this, all tests have come back with no reason for the SOB.  Patient states that she is back to all her normal activites.      30 day ADL'S goals met: Pt has been able to increase her endurance on the stairs at home but the SOB has not improved (MD's are working her up for the cause of this). Pt was able to tolerate 5 min but did have SOB    30 day Leisure goals met: Pt is walking 3x/week outside of cardiac rehab for  15minutes 2x/day. Pt does continue to feel SOB with activities. MD's are aware.     30 day Work goals met: Retired    30 Day Progression: Pt is limited by SOB at home with activites. Pt has reached a max MET level of 4 MET on ky stairs for 5 min and a 2.6-2.8 MET on the TM and bikes. Pt has utilized the arm bike.       Initial ADL's goals met: Goal met- Pt has resumed getting dressed with less SOB, fatigue.    Initial Leisure goals met: Goal not met- pt has not resumed dusting with less SOB, fatigue.    Intial Work goals met: Retired      Exercise Prescription  Exercise Mode: Treadmill;Bike;Nustep;Arm Erg.;Stairs;Hallway Walking    Frequency: 2x/week    Duration: 40-45 minutes    Intensity / THR: 20-30 beats above resting heart rate    RPE 11-14  Progression / Met level: 2.9+    Resistive Training?: Yes      Current Exercise (mins/week): 120      Interventions  Home Exercise:  Mode: walking    Frequency: 2-3x/week    Duration: 15-30 min.      Education Material : Educational videos;Provide written material;Individual education and counseling;Offer educational classes      Education Completed  Exercise Education Completed: Cardiac Anatomy;Signs and Symptoms;Medication review;RPE;Emergency Plan;Home Exercise;Warm up/cool down;FITT Principles;BP/HR Reponse to exercise;Stretching;Strength training;Benefits of Exercise;End point of exercise              Exercise Follow-up/Discharge  Follow up/Discharge: Patient has reached a 4 MET level on treadmill.  Patient requested to d/c [facility] re[facility] next week.  Patient states that she is back to her activites, still has the SOB, but states that it has not gotten worse   NUTRITION  Nutrition Assessment: Discharge      Nutrition Risk Factors:  Nutrition Risk Factors: Dyslipidemia;Diabetes  Monitors blood sugar at home: Yes  Frequency: 1x/week      Nutrition Plan  Interventions  Diet Consult: Declined    Other Nutrition Intervention: Therapist/Pt Discussion    Initial Rate Your  "Plate Score: 56      Education Completed  Nutrition Education Completed: Low Saturated fat diet;Risk factor overview;Low sodium diet;Weight management      Goals  Nutrition Goals (Next 30 days): Patient demonstrated understanding of cardiac nutrition, no goals identified for the next 30 days      Goals Met  Nutrition Goals Met: Patient can identify their risk factors for CAD;Patient follows a low sodium diet;Completed Nutritional Risk Screen;Provided Rate your Plate Survey;Reviewed Dietitian schedule;Patient states following a low saturated fat diet;Patient knows appropriate portion size;Patient will maintain current weight or gradual weight gain      Height, Weight, and  BMI  Weight: 116 lb (52.6 kg)  Height: 5' 5\" (1.651 m)  BMI: 19.3      Nutrition Follow-up  Follow-up/Discharge: Pt declines diet consult at this time. Pt has a good understanding of heart healthy diet. Pt does eat fruits and vegetables and follows a low fat and low salt diet. Pt and  do the cooking and label read. Pt does weigh herself everyday at home.          Other Risk Factors  Other Risk Factor Assessment: Discharge      HTN Risk Factor: Hypertension      Pre Exercise BP: 120/52  Post Exercise BP: 120/56      Hypertension Plan  Goals  HTN Goals: Patient demonstrates understanding of HTN, no goals identified for the next 30 days      Goals Met  HTN Goals Met: Follow low sodium diet;Take medication as prescribed;Exercises regularly      HTN Interventions  HTN Interventions: Therapist/patient discussion;Provide written material;Offer educational videos;Offer educational classes      HTN Education Completed  HTN Education Completed: Low sodium diet;Medication review;Risk factor overview      Tobacco Risk Factor: NA        Risk Factor Follow-up   Follow-up/Discharge: Pt monitors bp at home with her home cuff.      PSYCHOSOCIAL  Psychosocial Assessment: Discharge       [facility]: 17      JORGE-D Score: " 15      Psychosocial Risk Factor: Stress      Psychosocial Plan  Interventions  Interventions: Offer educational videos and classes;Provide written material;Individual education and counseling      Education Completed  Education Completed: Relaxation/Coping Techniques;S/S of depression;Effects of stress on body      Goals  Goals (Next 30 days): Improvement in [employer] CO[employer] score      Goals Met  Goals Met: Identified Support system;Oriented to stress management classes;Identify stressors;Practicing stress management skills      Psychosocial Follow-up  Follow-up/Discharge: Patient states that she enjoys baking and also reading the paper for relaxation.             Patient involved in Goal setting?: Yes      Signature: _____________________________________________________________    Date: __________________    Time: __________________

## 2021-06-24 NOTE — PATIENT INSTRUCTIONS - HE
[facility]    Exercise Goal:    Modality Duration Intensity   Warm-up 5 mins RPE: 9-10   Stretching     Walk 10-15 mins RPE: 11-14   Bike (Nustep) 20-30 mins Level 4, 75-85 steps/min   Treadmill 10-15 mins 2mph/1%   Other:      Cool Down 5 mins RPE: 9-10   Strength     Stretching       Target Heart Rate:  bpm    Range of Perceived Exertion: See above    Perceived exertion  (RPE)  Flash Scale   6  7      Very, very light  8  9      Very light  10  11    Fairly light  12  13    Somewhat hard  14  15    Hard  16  17    Very hard  18  19    Very, very hard  20     Special Comments/ Recommendations:  -Continue to exercise  -Take meds as prescribed  -Follow up with MDs as instructed.    Stop Exercise!!! If any of the following occur:    Angina/chest pain    Dizziness    Excessive perspiration/cold sweats    Abnormal shortness of breath    Changes in heart rate (slow, fast, irregular)    Sudden fatigue or numbness    Nausea      Also...    Avoid extreme temperatures - exercise indoors if necessary    Wait at least 1 hour after a meal before strenuous activity    Do not exercise if you have a fever or are ill    Wear comfortable, supportive athletic clothing